# Patient Record
Sex: MALE | Race: OTHER | HISPANIC OR LATINO | URBAN - METROPOLITAN AREA
[De-identification: names, ages, dates, MRNs, and addresses within clinical notes are randomized per-mention and may not be internally consistent; named-entity substitution may affect disease eponyms.]

---

## 2021-11-16 ENCOUNTER — EMERGENCY (EMERGENCY)
Facility: HOSPITAL | Age: 26
LOS: 1 days | Discharge: HOME | End: 2021-11-16
Attending: EMERGENCY MEDICINE | Admitting: EMERGENCY MEDICINE
Payer: OTHER MISCELLANEOUS

## 2021-11-16 VITALS
TEMPERATURE: 98 F | RESPIRATION RATE: 16 BRPM | HEART RATE: 78 BPM | OXYGEN SATURATION: 98 % | SYSTOLIC BLOOD PRESSURE: 138 MMHG | DIASTOLIC BLOOD PRESSURE: 88 MMHG | WEIGHT: 139.99 LBS
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
## 2021-11-16 DIAGNOSIS — S83.004A UNSPECIFIED DISLOCATION OF RIGHT PATELLA, INITIAL ENCOUNTER: ICD-10-CM

## 2021-11-16 DIAGNOSIS — M25.561 PAIN IN RIGHT KNEE: ICD-10-CM
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
## 2021-11-16 PROCEDURE — 73562 X-RAY EXAM OF KNEE 3: CPT | Mod: 26,RT

## 2021-11-16 PROCEDURE — 99284 EMERGENCY DEPT VISIT MOD MDM: CPT

## 2021-11-16 RX ORDER — HYDROMORPHONE HYDROCHLORIDE 2 MG/ML
1 INJECTION INTRAMUSCULAR; INTRAVENOUS; SUBCUTANEOUS ONCE
Refills: 0 | Status: DISCONTINUED | OUTPATIENT
Start: 2021-11-16 | End: 2021-11-16

## 2021-11-16 RX ADMIN — HYDROMORPHONE HYDROCHLORIDE 1 MILLIGRAM(S): 2 INJECTION INTRAMUSCULAR; INTRAVENOUS; SUBCUTANEOUS at 15:46

## 2021-11-16 NOTE — ED PROVIDER NOTE - NSFOLLOWUPINSTRUCTIONS_ED_ALL_ED_FT
Dislocation    A dislocation is a displacement of the bones that form a joint. This can result from trauma or due to a previous weakening of that joint. Symptoms include pain, swelling, and deformity at the site. Your health care provider has performed maneuvers to place the bones back in place. If a splint or brace was applied, make sure to wear it until you see an orthopedist as instructed.     Take over the counter acetaminophen (Tylenol) 650-1000 mg every 4-6 hours as needed for pain. Do not take more than 3000 mg in a 24 hour period. Be aware many over the counter and prescription medications also contain acetaminophen (Tylenol).     Take over the counter ibuprofen 400-600 mg every 6 hours with food as needed for pain.  Do not take these medications if you do not have pain or if you have any history of bleeding disorder or, kidney disease. Do not use ibuprofen if you are on blood thinners (anti-coagulation).     Use splint and crutches as instructed.     Follow up with orthopedics.     SEEK IMMEDIATE MEDICAL CARE IF YOU HAVE ANY OF THE FOLLOWING SYMPTOMS: numbness, tingling, pain, weakness, or skin color/temperature change in your right leg/foot. Dislocation    A dislocation is a displacement of the bones that form a joint. This can result from trauma or due to a previous weakening of that joint. Symptoms include pain, swelling, and deformity at the site. Your health care provider has performed maneuvers to place the bones back in place. If a splint or brace was applied, make sure to wear it until you see an orthopedist as instructed.     Take over the counter acetaminophen (Tylenol) 650-1000 mg every 4-6 hours as needed for pain. Do not take more than 3000 mg in a 24 hour period. Be aware many over the counter and prescription medications also contain acetaminophen (Tylenol).     Take over the counter ibuprofen 400-600 mg every 6 hours with food as needed for pain.  Do not take these medications if you do not have pain or if you have any history of bleeding disorder or, kidney disease. Do not use ibuprofen if you are on blood thinners (anti-coagulation).     Use splint and crutches as instructed.     Follow up with orthopedics as discussed.     SEEK IMMEDIATE MEDICAL CARE IF YOU HAVE ANY OF THE FOLLOWING SYMPTOMS: numbness, tingling, pain, weakness, or skin color/temperature change in your right leg/foot.

## 2021-11-16 NOTE — ED PROCEDURE NOTE - CPROC ED TIME OUT STATEMENT1
“Patient's name, , procedure and correct site were confirmed during the [place] Timeout.”
“Patient's name, , procedure and correct site were confirmed during the [place] Timeout.”

## 2021-11-16 NOTE — ED PROCEDURE NOTE - NS ED PERI VASCULAR NEG
fingers/toes warm to touch/no paresthesia/no swelling/no cyanosis of extremity/capillary refill time < 2 seconds
fingers/toes warm to touch/no paresthesia/no swelling/no cyanosis of extremity

## 2021-11-16 NOTE — ED BEHAVIORAL HEALTH NOTE - BEHAVIORAL HEALTH NOTE
SW was consulted to the ED by Provider to discuss follow up care with PT.  PT is scheduled for Orthopedic follow up appt on 11/24.  Team made aware and PHILLIP made available for further assistance.

## 2021-11-16 NOTE — ED PROVIDER NOTE - PHYSICAL EXAMINATION
CONSTITUTIONAL: non-toxic, uncomfortable appearing  SKIN: no rash, no petechiae.  EYES: pink conjunctiva, anicteric  NECK: Supple; no meningismus, no JVD  CARD: extremities warm, dry, well-perfused  RESP: no respiratory distress  ABD: non-distended  EXT: Right knee held in flexion with patella dislocated along lateral aspect with deformity. DP 2+, sensation grossly intact, FROM of toes.   NEURO: Alert, oriented.   PSYCH: Cooperative, appropriate.

## 2021-11-16 NOTE — ED PROVIDER NOTE - CLINICAL SUMMARY MEDICAL DECISION MAKING FREE TEXT BOX
[facility]-PGY3: 26 year old male with no pertinent PMH presents with right knee pain x 30 minutes. Pt states he was at work, twisted his body and had severe pain and inability to extend his right knee. Pt reports similar episode 10 years ago in his left knee. Denies any direct injury or trauma. Denies any numbness, weakness, fevers. Right knee held in flexion with lateral patella dislocation, tender. Leg neurovascularly intact. Provided analgesia and reduced patella without complication, pain resolved, post-reduction exam neurovascularly intact. Will splint with knee immobilization with ortho follow up and return precautions. Pt understood and agreeable with plan.

## 2021-11-16 NOTE — ED PROVIDER NOTE - ATTENDING CONTRIBUTION TO CARE
Pt is a 25yo M with no PMH who p/w R knee pain and deformity.  Twisting injury occurred ~30 minutes PTA.  Now unable to extend R knee.  Similar episode 10 years ago in left knee.   ROS - Denies any numbness, weakness, fevers.   PE - agree with resident exam as above. NVID.  A/P - Patellar dislocation.   Reduction performed.  Xray performed.  Placed in knee immobilizer.  Crutches.  Ortho f/u.

## 2021-11-16 NOTE — ED PROVIDER NOTE - OBJECTIVE STATEMENT
26 year old male with no pertinent PMH presents with right knee pain x 30 minutes. Pt states he was at work, twisted his body and had severe pain and inability to extend his right knee. Pt reports similar episode 10 years ago in his left knee. Denies any direct injury or trauma. Denies any numbness, weakness, fevers. Denies any additional complaints.

## 2021-11-16 NOTE — ED PROVIDER NOTE - PATIENT PORTAL LINK FT
You can access the FollowMyHealth Patient Portal offered by [facility] by registering at the following website: [email or web address]. By joining [email or web address] FollowMyHealth portal, you will also be able to view your health information using other applications (apps) compatible with our system.

## 2021-11-16 NOTE — ED PROVIDER NOTE - NS ED ROS FT
Review of Systems    Constitutional: (-) fever, (-) chills, (-) fatigue  Cardiovascular: (-) chest pain, (-) syncope  Respiratory: (-) cough, (-) shortness of breath  Musculoskeletal: (-) neck pain, (-) back pain, (+) knee pain  Integumentary: (-) rash, (-) edema, (-) wound  Neurological: (-) headache, (-) altered mental status    Except as documented in the HPI, all other systems are negative.

## 2021-11-16 NOTE — ED ADULT NURSE NOTE - OBJECTIVE STATEMENT
Dislocated right knee at work, previous dislocations of left knee, nv intact , pain ++, reduced 5mins after arrival to the er with dr cross and dr Maya and iv dilaudid

## 2021-11-16 NOTE — ED PROCEDURE NOTE - CPROC ED POST PROC CARE GUIDE1
Verbal/written post procedure instructions were given to patient/caregiver./Instructed patient/caregiver to follow-up with primary care physician./Instructed patient/caregiver regarding signs and symptoms of infection./Elevate the injured extremity as instructed./Keep the cast/splint/dressing clean and dry.
Instructed patient/caregiver to follow-up with primary care physician./Elevate the injured extremity as instructed./Keep the cast/splint/dressing clean and dry.

## 2021-11-19 PROBLEM — Z00.00 ENCOUNTER FOR PREVENTIVE HEALTH EXAMINATION: Status: ACTIVE | Noted: 2021-11-19

## 2021-11-22 ENCOUNTER — APPOINTMENT (OUTPATIENT)
Dept: ORTHOPEDIC SURGERY | Facility: CLINIC | Age: 26
End: 2021-11-22
Payer: MEDICAID

## 2021-11-22 ENCOUNTER — TRANSCRIPTION ENCOUNTER (OUTPATIENT)
Age: 26
End: 2021-11-22

## 2021-11-22 VITALS — HEIGHT: 67 IN | WEIGHT: 126 LBS | BODY MASS INDEX: 19.78 KG/M2

## 2021-11-22 DIAGNOSIS — Z78.9 OTHER SPECIFIED HEALTH STATUS: ICD-10-CM

## 2021-11-22 PROCEDURE — 99204 OFFICE O/P NEW MOD 45 MIN: CPT

## 2021-11-22 RX ORDER — ACETAMINOPHEN 650 MG/1
TABLET, FILM COATED, EXTENDED RELEASE ORAL
Refills: 0 | Status: ACTIVE | COMMUNITY

## 2021-11-22 RX ORDER — IBUPROFEN 600 MG/1
600 TABLET, FILM COATED ORAL
Qty: 20 | Refills: 0 | Status: ACTIVE | COMMUNITY
Start: 2021-08-23

## 2021-11-22 RX ORDER — AMOXICILLIN 500 MG/1
500 CAPSULE ORAL
Qty: 21 | Refills: 0 | Status: DISCONTINUED | COMMUNITY
Start: 2021-08-23

## 2021-12-10 ENCOUNTER — TRANSCRIPTION ENCOUNTER (OUTPATIENT)
Age: 26
End: 2021-12-10

## 2022-01-03 ENCOUNTER — APPOINTMENT (OUTPATIENT)
Dept: ORTHOPEDIC SURGERY | Facility: CLINIC | Age: 27
End: 2022-01-03
Payer: COMMERCIAL

## 2022-01-03 VITALS — BODY MASS INDEX: 19.78 KG/M2 | HEIGHT: 67 IN | WEIGHT: 126 LBS | RESPIRATION RATE: 16 BRPM

## 2022-01-10 ENCOUNTER — APPOINTMENT (OUTPATIENT)
Dept: ORTHOPEDIC SURGERY | Facility: CLINIC | Age: 27
End: 2022-01-10
Payer: COMMERCIAL

## 2022-01-24 ENCOUNTER — APPOINTMENT (OUTPATIENT)
Dept: ORTHOPEDIC SURGERY | Facility: CLINIC | Age: 27
End: 2022-01-24
Payer: COMMERCIAL

## 2022-02-07 ENCOUNTER — APPOINTMENT (OUTPATIENT)
Dept: ORTHOPEDIC SURGERY | Facility: CLINIC | Age: 27
End: 2022-02-07

## 2022-02-07 VITALS — HEIGHT: 67 IN | RESPIRATION RATE: 16 BRPM | WEIGHT: 126 LBS | BODY MASS INDEX: 19.78 KG/M2

## 2022-02-07 DIAGNOSIS — M24.80 OTHER SPECIFIC JOINT DERANGEMENTS OF UNSPECIFIED JOINT, NOT ELSEWHERE CLASSIFIED: ICD-10-CM

## 2022-02-07 DIAGNOSIS — S83.004A UNSPECIFIED DISLOCATION OF RIGHT PATELLA, INITIAL ENCOUNTER: ICD-10-CM

## 2022-02-07 PROCEDURE — 99213 OFFICE O/P EST LOW 20 MIN: CPT
